# Patient Record
Sex: FEMALE | Race: OTHER | Employment: OTHER | URBAN - METROPOLITAN AREA
[De-identification: names, ages, dates, MRNs, and addresses within clinical notes are randomized per-mention and may not be internally consistent; named-entity substitution may affect disease eponyms.]

---

## 2017-10-07 ENCOUNTER — OFFICE VISIT (OUTPATIENT)
Dept: ENDOCRINOLOGY CLINIC | Facility: CLINIC | Age: 62
End: 2017-10-07

## 2017-10-07 VITALS
HEART RATE: 71 BPM | BODY MASS INDEX: 25.16 KG/M2 | DIASTOLIC BLOOD PRESSURE: 65 MMHG | WEIGHT: 151 LBS | SYSTOLIC BLOOD PRESSURE: 102 MMHG | HEIGHT: 65 IN

## 2017-10-07 DIAGNOSIS — L65.9 HAIR LOSS: Primary | ICD-10-CM

## 2017-10-07 PROBLEM — R53.82 CHRONIC FATIGUE: Status: ACTIVE | Noted: 2017-10-07

## 2017-10-07 PROCEDURE — 99212 OFFICE O/P EST SF 10 MIN: CPT | Performed by: INTERNAL MEDICINE

## 2017-10-07 PROCEDURE — 99204 OFFICE O/P NEW MOD 45 MIN: CPT | Performed by: INTERNAL MEDICINE

## 2017-10-07 NOTE — PROGRESS NOTES
Name: Deena Dempsey  Date: 10/7/2017    Referring Physician: No ref.  provider found    Patient presents with:  Thyroid Problem      HISTORY OF PRESENT ILLNESS   Deena Dempsey is a 57year old female who presents for Patient presents with:  Thyroid Proble x7  No date: OTHER      Comment: benign fluid filled cyst  No date: TUBAL LIGATION    PHYSICAL EXAMINATION:  /65   Pulse 71   Ht 5' 5\" (1.651 m)   Wt 151 lb (68.5 kg)   BMI 25.13 kg/m²     General Appearance:  Alert, in no acute distress, well devel

## 2017-10-09 PROBLEM — R53.82 CHRONIC FATIGUE: Status: RESOLVED | Noted: 2017-10-07 | Resolved: 2017-10-09

## 2017-10-09 PROBLEM — L65.9 HAIR LOSS: Status: ACTIVE | Noted: 2017-10-09

## 2018-09-19 ENCOUNTER — PATIENT OUTREACH (OUTPATIENT)
Dept: CASE MANAGEMENT | Age: 63
End: 2018-09-19

## 2018-09-19 NOTE — PROGRESS NOTES
Patient informed is eligible for an establish care visit appointment with PCP, scheduled for 10/4/18.

## 2018-10-04 ENCOUNTER — OFFICE VISIT (OUTPATIENT)
Dept: FAMILY MEDICINE CLINIC | Facility: CLINIC | Age: 63
End: 2018-10-04

## 2018-10-04 ENCOUNTER — APPOINTMENT (OUTPATIENT)
Dept: LAB | Age: 63
End: 2018-10-04
Attending: FAMILY MEDICINE
Payer: MEDICARE

## 2018-10-04 VITALS
TEMPERATURE: 98 F | WEIGHT: 148 LBS | SYSTOLIC BLOOD PRESSURE: 90 MMHG | BODY MASS INDEX: 24.66 KG/M2 | HEART RATE: 58 BPM | DIASTOLIC BLOOD PRESSURE: 52 MMHG | HEIGHT: 65 IN

## 2018-10-04 DIAGNOSIS — Z20.2 POSSIBLE EXPOSURE TO STD: ICD-10-CM

## 2018-10-04 DIAGNOSIS — Z12.31 SCREENING MAMMOGRAM, ENCOUNTER FOR: ICD-10-CM

## 2018-10-04 DIAGNOSIS — E78.00 PURE HYPERCHOLESTEROLEMIA: ICD-10-CM

## 2018-10-04 DIAGNOSIS — B94.8 PANDAS (PEDIATRIC AUTOIMMUNE NEUROPSYCHIATRIC DISEASE ASSOCIATED WITH STREPTOCOCCAL INFECTION) (HCC): Primary | ICD-10-CM

## 2018-10-04 DIAGNOSIS — D89.89 PANDAS (PEDIATRIC AUTOIMMUNE NEUROPSYCHIATRIC DISEASE ASSOCIATED WITH STREPTOCOCCAL INFECTION) (HCC): Primary | ICD-10-CM

## 2018-10-04 PROCEDURE — 86704 HEP B CORE ANTIBODY TOTAL: CPT

## 2018-10-04 PROCEDURE — 86706 HEP B SURFACE ANTIBODY: CPT

## 2018-10-04 PROCEDURE — 86780 TREPONEMA PALLIDUM: CPT

## 2018-10-04 PROCEDURE — 86708 HEPATITIS A ANTIBODY: CPT

## 2018-10-04 PROCEDURE — 36415 COLL VENOUS BLD VENIPUNCTURE: CPT

## 2018-10-04 PROCEDURE — 87340 HEPATITIS B SURFACE AG IA: CPT

## 2018-10-04 PROCEDURE — 87389 HIV-1 AG W/HIV-1&-2 AB AG IA: CPT

## 2018-10-04 PROCEDURE — 80500 HEPATITIS A B + C PROFILE: CPT

## 2018-10-04 PROCEDURE — 99202 OFFICE O/P NEW SF 15 MIN: CPT | Performed by: FAMILY MEDICINE

## 2018-10-04 PROCEDURE — 86803 HEPATITIS C AB TEST: CPT

## 2018-10-04 NOTE — PROGRESS NOTES
Blood pressure 90/52, pulse 58, temperature 98.2 °F (36.8 °C), temperature source Oral, height 5' 5\" (1.651 m), weight 148 lb (67.1 kg). Patient presents today for an initial visit. She is concerned that she may have chlamydia.   [place]

## 2018-10-25 ENCOUNTER — PATIENT OUTREACH (OUTPATIENT)
Dept: CASE MANAGEMENT | Age: 63
End: 2018-10-25

## 2018-10-25 ENCOUNTER — TELEPHONE (OUTPATIENT)
Dept: FAMILY MEDICINE CLINIC | Facility: CLINIC | Age: 63
End: 2018-10-25

## 2018-11-03 ENCOUNTER — PATIENT MESSAGE (OUTPATIENT)
Dept: FAMILY MEDICINE CLINIC | Facility: CLINIC | Age: 63
End: 2018-11-03

## 2018-11-03 NOTE — TELEPHONE ENCOUNTER
From: Paul Pena  To: Mirna Mclean DO  Sent: 11/3/2018 3:22 PM CDT  Subject: Test Results Question    Several tests were run at my appointment Oct 4 with Dr. Patricio Bustamante. I have not received any results and they are not posted in [facility].    What do

## 2018-11-05 NOTE — TELEPHONE ENCOUNTER
Advised patient that Dr Fermin Dodge has not reviewed her 10/4/18 lab results but all labs including Pap were negative/normal and that she is not immune to Hepatitis B.  Advised patient that I will let Dr Chalino Bermudez know that we did advise her of results an

## 2018-11-28 ENCOUNTER — TELEPHONE (OUTPATIENT)
Dept: FAMILY MEDICINE CLINIC | Facility: CLINIC | Age: 63
End: 2018-11-28

## 2018-12-04 ENCOUNTER — MA CHART PREP (OUTPATIENT)
Dept: FAMILY MEDICINE CLINIC | Facility: CLINIC | Age: 63
End: 2018-12-04

## 2019-04-08 ENCOUNTER — TELEPHONE (OUTPATIENT)
Dept: ENDOCRINOLOGY CLINIC | Facility: CLINIC | Age: 64
End: 2019-04-08

## 2019-04-08 NOTE — TELEPHONE ENCOUNTER
Unfortunately I don't think we can really change the code to thyroid since she doesn't have thyroid disease. We could try changing the code to fatigue? Thanks.

## 2019-04-08 NOTE — TELEPHONE ENCOUNTER
Pt requesting to speak with RN - already went to billing/coding department. Pt says there a dispute in re: to how her 10/2017 visit was billed, under hair loss - states it is suppose to be a visit for thyroid issues. Pls call. Thank you.

## 2019-04-10 NOTE — TELEPHONE ENCOUNTER
Message from billing:Noted below and sent [employer] message to patient. Rene Maxwell - a correction has been made to the claim (10/7/2017) & a request was sent to recall from bad debt.  No guarantees.      Thanks   [employer]